# Patient Record
Sex: FEMALE | Race: OTHER | URBAN - METROPOLITAN AREA
[De-identification: names, ages, dates, MRNs, and addresses within clinical notes are randomized per-mention and may not be internally consistent; named-entity substitution may affect disease eponyms.]

---

## 2017-01-23 ENCOUNTER — TELEPHONE (OUTPATIENT)
Dept: RHEUMATOLOGY | Age: 32
End: 2017-01-23

## 2017-01-27 ENCOUNTER — TELEPHONE (OUTPATIENT)
Dept: RHEUMATOLOGY | Age: 32
End: 2017-01-27

## 2017-01-30 ENCOUNTER — OFFICE VISIT (OUTPATIENT)
Dept: RHEUMATOLOGY | Age: 32
End: 2017-01-30

## 2017-01-30 VITALS
HEIGHT: 57 IN | RESPIRATION RATE: 20 BRPM | TEMPERATURE: 98.3 F | OXYGEN SATURATION: 99 % | BODY MASS INDEX: 26.8 KG/M2 | SYSTOLIC BLOOD PRESSURE: 130 MMHG | HEART RATE: 83 BPM | WEIGHT: 124.2 LBS | DIASTOLIC BLOOD PRESSURE: 78 MMHG

## 2017-01-30 DIAGNOSIS — K20.90 ESOPHAGITIS: ICD-10-CM

## 2017-01-30 DIAGNOSIS — Z79.1 LONG TERM CURRENT USE OF NON-STEROIDAL ANTI-INFLAMMATORIES (NSAID): ICD-10-CM

## 2017-01-30 DIAGNOSIS — Z15.89 HLA B27 (HLA B27 POSITIVE): ICD-10-CM

## 2017-01-30 DIAGNOSIS — M35.7 HYPERMOBILITY SYNDROME: ICD-10-CM

## 2017-01-30 DIAGNOSIS — M79.7 FIBROMYALGIA: Primary | ICD-10-CM

## 2017-01-30 DIAGNOSIS — F43.10 PTSD (POST-TRAUMATIC STRESS DISORDER): ICD-10-CM

## 2017-01-30 RX ORDER — MONTELUKAST SODIUM 10 MG/1
10 TABLET ORAL EVERY EVENING
COMMUNITY

## 2017-01-30 RX ORDER — LANOLIN ALCOHOL/MO/W.PET/CERES
1 CREAM (GRAM) TOPICAL 2 TIMES DAILY
COMMUNITY

## 2017-01-30 RX ORDER — LEVOCETIRIZINE DIHYDROCHLORIDE 5 MG/1
5 TABLET, FILM COATED ORAL DAILY
COMMUNITY

## 2017-01-30 RX ORDER — BUDESONIDE AND FORMOTEROL FUMARATE DIHYDRATE 160; 4.5 UG/1; UG/1
2 AEROSOL RESPIRATORY (INHALATION) 2 TIMES DAILY
COMMUNITY

## 2017-01-30 RX ORDER — EPINEPHRINE 0.3 MG/.3ML
0.3 INJECTION SUBCUTANEOUS
COMMUNITY

## 2017-01-30 RX ORDER — ZINC GLUCONATE 50 MG
TABLET ORAL DAILY
COMMUNITY

## 2017-01-30 RX ORDER — PANTOPRAZOLE SODIUM 40 MG/1
40 TABLET, DELAYED RELEASE ORAL DAILY
COMMUNITY

## 2017-01-30 RX ORDER — LANOLIN ALCOHOL/MO/W.PET/CERES
1000 CREAM (GRAM) TOPICAL DAILY
COMMUNITY

## 2017-01-30 RX ORDER — ACETAMINOPHEN 500 MG
4000 TABLET ORAL DAILY
COMMUNITY

## 2017-01-30 NOTE — Clinical Note
Please send my note Vanesa Levy NP   [id number] [place] [address], [facility] Phone: [phone]
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
## 2017-01-30 NOTE — PATIENT INSTRUCTIONS
FIBROMYALGIA    Fibromyalgia is a disease characterized by chronic widespread musculoskeletal pain. Fibromyalgia is caused by abnormal processing of pain signals in the central nervous system, leading to exaggerated pain responses. There are functional MRI studies that have shown neuroplasticity (re-wiring) of the pain pathways in the brain. Physical and Mental Exercise    The mainstay of therapy includes non-pharmacologic therapies such as cardiovascular exercise and Cognitive Behavioral Therapy which have been shown to be of benefit ([address] [facility], Am J Med 2009). Nuno Chi in particular has proven efficacy in the treatment of fibromyalgia Ladi Castro al. [facility] J Med 2010; [id number]). Performing at least 60 minutes per day of Diamariania Chyle has been shown to improve pain without medical management. Medications    After discussing with your primary care and if medications are pursued, pregabalin (Lyrica), gabapentin (Neurontin), milnacipran ([place]), and duloxetine (Cymbalta) are FDA approved medications for the treatment of fibromyalgia. Narcotic Pain Medications Are BAD    Narcotics, such as oxycodone, hydrocodone, morphine, dilaudid, or codeine, have not been proven to be efficacious in the treatment of fibromyalgia. In fact, narcotic use in this patient population has been observed to exacerbate depression, and may enhance the hyperalgesia which is characteristic of this condition (Daun Gear Rheum 2006). They also are at increased risk for opioid-induced hyperalgesia due predominantly to central sensitization Andrea Samayoa al. Yahaira Jones Clin Rheumatol. 2013 Mar;19(2):72-7). Specifically, a double-blind placebo-controlled trial by Adam Swift al published in 1995 demonstrated that intravenous morphine did not reduce pain in fibromyalgia patients.  A study by Darcy Nieves al published in 2003 showed that fibromyalgia patients taking oral opiates did not experience improvement in their pain at four years of follow up, and also reported increased depression over the last two years of the study. There is subsequent concern that the prolonged use of narcotics to treat fibromyalgia may cause harm to these patients Tina Burleson, Pain 2005). Finally, opioid use in fibromyalgia had poorer symptoms and functional and occupational status compared to nonusers (Greyson CARPIO et al. Pain Res Treat. 5995;6469:221177). Therefore, I recommend that narcotics be avoided in all patients with fibromyalgia. My Recommendations    I recommend Nuno Chi stretching exercises for at least 30 minutes per day. The [employer] has made a videotape of Nuno Chi that you can borrow from [employer], purchase online or watch for free on [employer] [place] [employer]. com Nuno Chi for Arthritis. You may join a gym that has classes    We discussed treating secondary causes, such as sleep apnea, poor sleep quality, weight loss, vitamin deficiencies, such as vitamin D, and pursuing aquatherapy. I encourage you to do Ysitie 71. If Motrin is not helping, I recommend you stop using it. Hypermobility Syndrome. This is an heritable disorder that is associated with chronic idiopathic pain due to laxity of ligaments and tendons (Norbert MASON et al. Phyiostherapy. 2013 Oct 5). The management to better function and reduce pain is through a physical conditioning program. Physical therapy with individualized muscle strengthening is key, in particular core strengthening. Referral to a rehabilitation medicine specialist who has expertise in treating patients with hypermobility-related conditions may be appropriate if she does not improve.

## 2017-01-30 NOTE — MR AVS SNAPSHOT
Visit Information Date & Time Provider Department Dept. Phone Encounter #  
 1/30/2017  3:00 PM Efrem Lerma MD [facility] [address] [phone] Upcoming Health Maintenance Date Due DTaP/Tdap/Td series (1 - Tdap) 10/9/2004 PAP AKA CERVICAL CYTOLOGY 10/9/2004 INFLUENZA AGE 9 TO ADULT 8/1/2016 Allergies as of 1/30/2017  Review Complete On: 1/30/2017 By: Efrem Lerma MD  
  
 Severity Noted Reaction Type Reactions Other Food  01/30/2017    Hives, Itching Amoxicillin  01/30/2017    Hives Aspirin  01/30/2017    Other (comments)  
 tembling Morphine  01/30/2017    Other (comments) Vomiting Other Plant, Animal, Environmental  01/30/2017    Hives, Itching Penicillins  01/30/2017    Hives Percocet [Oxycodone-acetaminophen]  01/30/2017    Other (comments) Vomiting Tetanus Toxoid, Adsorbed  01/30/2017    Swelling Swelling on whole left side of her body patient states. Current Immunizations  Never Reviewed No immunizations on file. Not reviewed this visit You Were Diagnosed With   
  
 Codes Comments Hypermobility syndrome    -  Primary ICD-10-CM: M35.7 ICD-9-CM: 728.5 Fibromyalgia     ICD-10-CM: M79.7 ICD-9-CM: 729.1 PTSD (post-traumatic stress disorder)     ICD-10-CM: F43.10 ICD-9-CM: 309.81 HLA B27 (HLA B27 positive)     ICD-10-CM: Z15.89 ICD-9-CM: V84.89 Long term current use of non-steroidal anti-inflammatories (NSAID)     ICD-10-CM: Z79.1 ICD-9-CM: V58.64 Vitals BP Pulse Temp Resp Height(growth percentile) Weight(growth percentile) 130/78 (BP 1 Location: Right arm, BP Patient Position: Sitting) 83 98.3 °F (36.8 °C) (Oral) 20 4' 9.25\" (1.454 m) 124 lb 3.2 oz (56.3 kg) LMP SpO2 BMI OB Status Smoking Status 12/30/2014 99% 26.64 kg/m2 Hysterectomy Never Smoker Vitals History BMI and BSA Data Body Mass Index Body Surface Area 26.64 kg/m 2 1.51 m 2 Preferred Pharmacy Pharmacy Name Phone [phone] [facility], [address] [facility] [id number] [phone] Your Updated Medication List  
  
   
This list is accurate as of: 1/30/17  5:00 PM.  Always use your most recent med list.  
  
  
  
  
 Cholecalciferol (Vitamin D3) 2,000 unit Cap capsule Commonly known as:  VITAMIN D3 Take 4,000 Units by mouth daily. cyanocobalamin 1,000 mcg tablet Take 1,000 mcg by mouth daily. EPIPEN 0.3 mg/0.3 mL injection Generic drug:  EPINEPHrine  
0.3 mg by IntraMUSCular route once as needed. glucosamine-chondroitin 500-400 mg Cap Commonly known as:  [facility] Take 1 Cap by mouth two (2) times a day. Magnesium Oxide 500 mg Cap Take  by mouth daily. MOTRIN PO Take 600 mg by mouth. Takes every 6-8 hours as needed. PROTONIX 40 mg tablet Generic drug:  pantoprazole Take 40 mg by mouth daily. SINGULAIR 10 mg tablet Generic drug:  montelukast  
Take 10 mg by mouth every evening. SYMBICORT 160-4.5 mcg/actuation HFA inhaler Generic drug:  budesonide-formoterol Take 2 Puffs by inhalation two (2) times a day. XYZAL 5 mg tablet Generic drug:  levocetirizine Take 5 mg by mouth daily. Patient Instructions FIBROMYALGIA Fibromyalgia is a disease characterized by chronic widespread musculoskeletal pain. Fibromyalgia is caused by abnormal processing of pain signals in the central nervous system, leading to exaggerated pain responses. There are functional MRI studies that have shown neuroplasticity (re-wiring) of the pain pathways in the brain. Physical and Mental Exercise The mainstay of therapy includes non-pharmacologic therapies such as cardiovascular exercise and Cognitive Behavioral Therapy which have been shown to be of benefit ([address], Am J Med 2009).  Nuno Chi in particular has proven efficacy in the treatment of fibromyalgia Mateusz Santiago al. [facility] J Med 2010; [phone]). Performing at least 60 minutes per day of [place] [employer] has been shown to improve pain without medical management. Medications After discussing with your primary care and if medications are pursued, pregabalin (Lyrica), gabapentin (Neurontin), milnacipran (Sallyann Pink), and duloxetine (Cymbalta) are FDA approved medications for the treatment of fibromyalgia. Narcotic Pain Medications Are BAD Narcotics, such as oxycodone, hydrocodone, morphine, dilaudid, or codeine, have not been proven to be efficacious in the treatment of fibromyalgia. In fact, narcotic use in this patient population has been observed to exacerbate depression, and may enhance the hyperalgesia which is characteristic of this condition ([facility] Tracy [facility]um 2006). They also are at increased risk for opioid-induced hyperalgesia due predominantly to central sensitization Fatoumata GREEN et al. [facility] Clin Rheumatol. 2013 Mar;19(2):72-7). Specifically, a double-blind placebo-controlled trial by Hannah Escalona al published in 1995 demonstrated that intravenous morphine did not reduce pain in fibromyalgia patients. A study by Koko Lind al published in 2003 showed that fibromyalgia patients taking oral opiates did not experience improvement in their pain at four years of follow up, and also reported increased depression over the last two years of the study. There is subsequent concern that the prolonged use of narcotics to treat fibromyalgia may cause harm to these patients Wanda Plascencia, Pain 2005). Finally, opioid use in fibromyalgia had poorer symptoms and functional and occupational status compared to nonusers (Greyson CARPIO et al. Pain Res Treat. 1270;[id number]). Therefore, I recommend that narcotics be avoided in all patients with fibromyalgia. My Recommendations I recommend Nuno Chi stretching exercises for at least 30 minutes per day. The [employer] has made a videotape of Nuno Chi that you can borrow from [employer], purchase online or watch for free on [employer]. com Nuno Chi for Arthritis. You may join a gym that has classes We discussed treating secondary causes, such as sleep apnea, poor sleep quality, weight loss, vitamin deficiencies, such as vitamin D, and pursuing aquatherapy. I encourage you to do [address]. If Motrin is not helping, I recommend you stop using it. Hypermobility Syndrome. This is an heritable disorder that is associated with chronic idiopathic pain due to laxity of ligaments and tendons (Norbert MASON et al. Phyiostherapy. 2013 Oct 5). The management to better function and reduce pain is through a physical conditioning program. Physical therapy with individualized muscle strengthening is key, in particular core strengthening. Referral to a rehabilitation medicine specialist who has expertise in treating patients with hypermobility-related conditions may be appropriate if she does not improve. Introducing [facility]! Sudeep Chisholm introduces [employer] patient portal. Now you can access parts of your medical record, email your doctor's office, and request medication refills online. 1. In your internet browser, go to [email or web address] 2. Click on the First Time User? Click Here link in the Sign In box. You will see the New Member Sign Up page. 3. Enter your [employer] Access Code exactly as it appears below. You will not need to use this code after youve completed the sign-up process. If you do not sign up before the expiration date, you must request a new code. · [employer] Access Code: [id number] [facility] Expires: 4/30/2017  4:48 PM 
 
4. Enter the last four digits of your Social Security Number (xxxx) and Date of Birth (mm/dd/yyyy) as indicated and click Submit. You will be taken to the next sign-up page. 5. Create a [employer] ID. This will be your [employer] login ID and cannot be changed, so think of one that is secure and easy to remember. 6. Create a [employer] password. You can change your password at any time. 7. Enter your Password Reset Question and Answer. This can be used at a later time if you forget your password. 8. Enter your e-mail address. You will receive e-mail notification when new information is available in [address] 9. Click Sign Up. You can now view and download portions of your medical record. 10. Click the Download Summary menu link to download a portable copy of your medical information. If you have questions, please visit the Frequently Asked Questions section of the [employer] website. Remember, [employer] is NOT to be used for urgent needs. For medical emergencies, dial 911. Now available from your iPhone and Android! Please provide this summary of care documentation to your next provider. Your primary care clinician is listed as Phys Other. If you have any questions after today's visit, please call [phone].

## 2017-01-31 ENCOUNTER — TELEPHONE (OUTPATIENT)
Dept: RHEUMATOLOGY | Age: 32
End: 2017-01-31